# Patient Record
Sex: MALE
[De-identification: names, ages, dates, MRNs, and addresses within clinical notes are randomized per-mention and may not be internally consistent; named-entity substitution may affect disease eponyms.]

---

## 2024-03-28 PROBLEM — Z00.00 ENCOUNTER FOR PREVENTIVE HEALTH EXAMINATION: Status: ACTIVE | Noted: 2024-03-28

## 2024-04-02 ENCOUNTER — APPOINTMENT (OUTPATIENT)
Dept: ORTHOPEDIC SURGERY | Facility: CLINIC | Age: 54
End: 2024-04-02
Payer: OTHER MISCELLANEOUS

## 2024-04-02 VITALS
OXYGEN SATURATION: 98 % | WEIGHT: 200 LBS | HEART RATE: 78 BPM | SYSTOLIC BLOOD PRESSURE: 138 MMHG | DIASTOLIC BLOOD PRESSURE: 82 MMHG | HEIGHT: 75 IN | BODY MASS INDEX: 24.87 KG/M2

## 2024-04-02 DIAGNOSIS — M16.12 UNILATERAL PRIMARY OSTEOARTHRITIS, LEFT HIP: ICD-10-CM

## 2024-04-02 PROCEDURE — 99204 OFFICE O/P NEW MOD 45 MIN: CPT

## 2024-04-02 PROCEDURE — 73502 X-RAY EXAM HIP UNI 2-3 VIEWS: CPT

## 2024-04-02 NOTE — HISTORY OF PRESENT ILLNESS
[FreeTextEntry1] : Mr. HERBERT CHRISTINE is a 53 year old male presenting for workers comp evaluation of left hip pain. His hip is the bigger concern. His hip pain is localized to the groin and radiates down the anterior thigh to the anterior right knee. Pain is worse with all weightbearing activity. Patient has tried PT and NSAIDs without improvement. He has not had injections.

## 2024-04-02 NOTE — PHYSICAL EXAM
[de-identified] : The patient appears well nourished and in no apparent distress.  The patient is alert and oriented to person, place, and time.   Affect and mood appear normal. The head is normocephalic and atraumatic.  The eyes reveal normal sclera and extra ocular muscles are intact. The tongue is midline with no apparent lesions.  Skin shows normal turgor with no evidence of eczema or psoriasis.  No respiratory distress noted.  Sensation grossly intact.		  [de-identified] : Exam of the left hip shows hip flexion of 90 degrees, hip external rotation of 25 degrees, hip internal rotation of 0 degrees.  5/5 motor strength bilaterally distally. Sensation intact distally.		  [de-identified] : X-ray: AP view of the pelvis and 2 views of the left hip demonstrate bone on bone arthritis.

## 2024-04-02 NOTE — ADDENDUM
[FreeTextEntry1] : This note was authored by Nathan Helms working as a medical scribe for Dr. Chucho Schaefer. The note was reviewed, edited, and revised by Dr. Chucho Schaefer whom is in agreement with the exam findings, imaging findings, and treatment plan. 04/02/2024

## 2024-04-02 NOTE — DISCUSSION/SUMMARY
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 While the patient may eventually benefit from left total hip replacement, he has not yet exhausted nonoperative treatment options. As such, the patient was recommended to undergo a left hip intra-articular cortisone injection under imaging guidance for therapeutic purposes.  A course of Mobic was also recommended. The patient was given a prescription for the Mobic with directions. He was instructed to stop the medicine and call the office if there are any adverse reaction to the medicine. The patient will follow up 3 weeks post injection.

## 2024-04-03 RX ORDER — MELOXICAM 7.5 MG/1
7.5 TABLET ORAL
Qty: 60 | Refills: 0 | Status: ACTIVE | COMMUNITY
Start: 2024-04-03 | End: 1900-01-01

## 2024-04-16 ENCOUNTER — APPOINTMENT (OUTPATIENT)
Dept: ULTRASOUND IMAGING | Facility: CLINIC | Age: 54
End: 2024-04-16

## 2025-06-03 ENCOUNTER — OFFICE (OUTPATIENT)
Dept: URBAN - METROPOLITAN AREA CLINIC 1 | Facility: CLINIC | Age: 55
Setting detail: OPHTHALMOLOGY
End: 2025-06-03
Payer: COMMERCIAL

## 2025-06-03 DIAGNOSIS — H16.223: ICD-10-CM

## 2025-06-03 DIAGNOSIS — H01.001: ICD-10-CM

## 2025-06-03 DIAGNOSIS — H01.004: ICD-10-CM

## 2025-06-03 DIAGNOSIS — H00.022: ICD-10-CM

## 2025-06-03 DIAGNOSIS — D49.2: ICD-10-CM

## 2025-06-03 PROCEDURE — 99203 OFFICE O/P NEW LOW 30 MIN: CPT

## 2025-06-03 ASSESSMENT — REFRACTION_CURRENTRX
OD_OVR_VA: 20/
OD_CYLINDER: -0.75
OS_OVR_VA: 20/
OS_VPRISM_DIRECTION: SV
OD_AXIS: 007
OS_SPHERE: -2.75
OD_VPRISM_DIRECTION: SV
OS_AXIS: 169
OD_SPHERE: -2.75
OS_CYLINDER: -0.75

## 2025-06-03 ASSESSMENT — TONOMETRY
OS_IOP_MMHG: 18
OD_IOP_MMHG: 18

## 2025-06-03 ASSESSMENT — REFRACTION_MANIFEST
OD_VA1: 20/20
OS_SPHERE: -4.25
OD_SPHERE: -3.75
OS_AXIS: 080
OS_VA1: 20/20
OD_AXIS: 085
OS_CYLINDER: +1.00
OD_CYLINDER: +1.00

## 2025-06-03 ASSESSMENT — KERATOMETRY
OD_K2POWER_DIOPTERS: 42.50
OS_K2POWER_DIOPTERS: 43.00
OD_AXISANGLE_DEGREES: 084
OD_K1POWER_DIOPTERS: 42.25
OS_K1POWER_DIOPTERS: 42.25
OS_AXISANGLE_DEGREES: 078

## 2025-06-03 ASSESSMENT — REFRACTION_AUTOREFRACTION
OS_AXIS: 173
OS_CYLINDER: -0.75
OS_SPHERE: -3.50
OD_AXIS: 002
OD_CYLINDER: -0.25
OD_SPHERE: -3.50

## 2025-06-03 ASSESSMENT — VISUAL ACUITY
OS_BCVA: 20/20-2
OD_BCVA: 20/20

## 2025-06-03 ASSESSMENT — LID EXAM ASSESSMENTS
OS_BLEPHARITIS: T
OD_BLEPHARITIS: T